# Patient Record
Sex: FEMALE | Race: WHITE | NOT HISPANIC OR LATINO | Employment: FULL TIME | URBAN - NONMETROPOLITAN AREA
[De-identification: names, ages, dates, MRNs, and addresses within clinical notes are randomized per-mention and may not be internally consistent; named-entity substitution may affect disease eponyms.]

---

## 2023-04-21 ENCOUNTER — TELEMEDICINE (OUTPATIENT)
Dept: PRIMARY CARE | Facility: CLINIC | Age: 28
End: 2023-04-21
Payer: COMMERCIAL

## 2023-04-21 DIAGNOSIS — F41.9 ANXIETY: Primary | ICD-10-CM

## 2023-04-21 DIAGNOSIS — F41.0 PANIC ATTACK: ICD-10-CM

## 2023-04-21 PROCEDURE — 99212 OFFICE O/P EST SF 10 MIN: CPT | Performed by: NURSE PRACTITIONER

## 2023-04-21 RX ORDER — HYDROXYZINE PAMOATE 25 MG/1
25 CAPSULE ORAL EVERY 6 HOURS PRN
COMMUNITY
End: 2023-06-16 | Stop reason: SDUPTHER

## 2023-04-21 RX ORDER — PAROXETINE 10 MG/1
10 TABLET, FILM COATED ORAL EVERY MORNING
Qty: 30 TABLET | Refills: 1 | Status: SHIPPED | OUTPATIENT
Start: 2023-04-21 | End: 2023-06-16 | Stop reason: SDUPTHER

## 2023-04-21 RX ORDER — LEVONORGESTREL 52 MG/1
1 INTRAUTERINE DEVICE INTRAUTERINE ONCE
COMMUNITY

## 2023-04-21 ASSESSMENT — ENCOUNTER SYMPTOMS
ORTHOPNEA: 1
WHEEZING: 0
NECK PAIN: 0
HEADACHES: 1
ABDOMINAL PAIN: 0
FEVER: 0
SYNCOPE: 0
LEG PAIN: 0
SWOLLEN GLANDS: 0
SORE THROAT: 0
HEMOPTYSIS: 0
SHORTNESS OF BREATH: 1
PND: 0
CLAUDICATION: 0
SPUTUM PRODUCTION: 1
RHINORRHEA: 0
VOMITING: 0

## 2023-04-21 NOTE — PROGRESS NOTES
"Subjective   Patient ID: Amanda Mcgill is a 28 y.o. female who presents for No chief complaint on file..    This is a virtual visit with video.  Patient is requested and is agreeable to be on video   Amanda was seen on telehealth for follow-up from an emergency room visit 5 days ago.  She states that approximately 6 days ago she was at a family picnic with multiple people she has chronic anxiety including social anxiety.  She said she had a very difficult time throughout the day interacting with people started late in the day to get some lightheadedness difficulty with breathing inability to take a deep breath.  Pounding in her chest.  Had a very difficult time sleeping that night due to the symptoms Feeling like she was going to pass out.  Her mother took her to the emergency room the following day.  Complete work-up was done and IV fluids were given secondary to \"mild dehydration\".  Emergency room provider diagnosed her with panic attacks from chronic anxiety.  They prescribed hydroxyzine 25 mg p.o. every 6 hours as needed which she has been using.  They gave her a temporary supply she needs to know what to do from here.  She states that she has regular anxiety almost daily.  Panic attacks are happening more often frequently than they used to.  Since this most recent event she is worried every single day that she is going to have another panic attack.  We discussed possible treatment she would like to take a medication daily to prevent the anxiety and panic.    Shortness of Breath  This is a new problem. The current episode started 1 to 4 weeks ago. The problem occurs daily. The problem has been waxing and waning. Associated symptoms include chest pain, ear pain, headaches, orthopnea and sputum production. Pertinent negatives include no abdominal pain, claudication, coryza, fever, hemoptysis, leg pain, leg swelling, neck pain, PND, rash, rhinorrhea, sore throat, swollen glands, syncope, vomiting or wheezing. " The symptoms are aggravated by emotional upset and any activity.        Review of Systems   Constitutional:  Negative for fever.   HENT:  Positive for ear pain. Negative for rhinorrhea and sore throat.    Respiratory:  Positive for sputum production and shortness of breath. Negative for hemoptysis and wheezing.    Cardiovascular:  Positive for chest pain and orthopnea. Negative for claudication, leg swelling, syncope and PND.   Gastrointestinal:  Negative for abdominal pain and vomiting.   Musculoskeletal:  Negative for neck pain.   Skin:  Negative for rash.   Neurological:  Positive for headaches.       Objective   There were no vitals taken for this visit.    Physical Exam  Constitutional:       Comments: Unable to complete physical exam secondary to virtual video however she was seen in her home she was walking from the outside into her house.  She is smoking a cigarette.  She looks comfortable interactive smiling.         Assessment/Plan     Amanda was seen today for follow-up.  Diagnoses and all orders for this visit:  Anxiety (Primary)  Comments:  start paxil 10 mg po qd. f/u 3 weeks. can use the hydroxyzine as needed until we find a therapeutic dose of Paxil.  Orders:  -     PARoxetine (Paxil) 10 mg tablet; Take 1 tablet (10 mg) by mouth once daily in the morning.  Panic attack  -     PARoxetine (Paxil) 10 mg tablet; Take 1 tablet (10 mg) by mouth once daily in the morning.  Other orders  -     Follow Up In Primary Care; Future

## 2023-05-12 ENCOUNTER — OFFICE VISIT (OUTPATIENT)
Dept: PRIMARY CARE | Facility: CLINIC | Age: 28
End: 2023-05-12
Payer: COMMERCIAL

## 2023-05-12 VITALS
BODY MASS INDEX: 23.39 KG/M2 | SYSTOLIC BLOOD PRESSURE: 116 MMHG | HEART RATE: 92 BPM | DIASTOLIC BLOOD PRESSURE: 80 MMHG | WEIGHT: 137 LBS | RESPIRATION RATE: 18 BRPM | HEIGHT: 64 IN | TEMPERATURE: 97.4 F | OXYGEN SATURATION: 98 %

## 2023-05-12 DIAGNOSIS — F41.0 PANIC ATTACK: ICD-10-CM

## 2023-05-12 DIAGNOSIS — F41.9 ANXIETY: ICD-10-CM

## 2023-05-12 DIAGNOSIS — E67.3 HYPERVITAMINOSIS D: Primary | ICD-10-CM

## 2023-05-12 LAB
THYROTROPIN (MIU/L) IN SER/PLAS BY DETECTION LIMIT <= 0.05 MIU/L: 0.37 MIU/L (ref 0.44–3.98)
THYROXINE (T4) FREE (NG/DL) IN SER/PLAS: 0.94 NG/DL (ref 0.61–1.12)

## 2023-05-12 PROCEDURE — 82306 VITAMIN D 25 HYDROXY: CPT

## 2023-05-12 PROCEDURE — 84443 ASSAY THYROID STIM HORMONE: CPT

## 2023-05-12 PROCEDURE — 84439 ASSAY OF FREE THYROXINE: CPT

## 2023-05-12 PROCEDURE — 82607 VITAMIN B-12: CPT

## 2023-05-12 PROCEDURE — 99213 OFFICE O/P EST LOW 20 MIN: CPT | Performed by: NURSE PRACTITIONER

## 2023-05-12 NOTE — PROGRESS NOTES
"Subjective   Patient ID: Amanda Mcgill is a 28 y.o. female who presents for Follow-up (3wk ).    Here for follow up anxiety. Started the Paxil. Still anxious when around people. Denies side effects. States she feels better but still concerned with the social anxiety. On no other meds or supplements. No other acute concerns         Review of Systems   All other systems reviewed and are negative.      Objective   /80   Pulse 92   Temp 36.3 °C (97.4 °F)   Resp 18   Ht 1.626 m (5' 4\")   Wt 62.1 kg (137 lb)   SpO2 98%   BMI 23.52 kg/m²     Physical Exam  Cardiovascular:      Rate and Rhythm: Normal rate and regular rhythm.   Pulmonary:      Effort: Pulmonary effort is normal.      Breath sounds: Normal breath sounds.   Neurological:      Mental Status: She is alert and oriented to person, place, and time.   Psychiatric:         Mood and Affect: Mood normal.         Behavior: Behavior normal.         Assessment/Plan     Amanda was seen today for follow-up.  Diagnoses and all orders for this visit:  Hypervitaminosis D (Primary)  Comments:  last Vit D leevel was 100 not on suppleemnt but was tanning. will update  Orders:  -     Vitamin D, Total  Anxiety  Comments:  better but still having breakthrough 2-3 times a week. Does not care for the hydroxyzine. Try supplements-ashwaganda and Mg. I educated  Orders:  -     Thyroid Stimulating Hormone  -     Thyroxine, Free  -     Vitamin B12  Panic attack  Comments:  cont paxil. f/u 1 month for review of S&S&  Orders:  -     Thyroid Stimulating Hormone  -     Thyroxine, Free  -     Vitamin B12  Other orders  -     Follow Up In Primary Care; Future        "

## 2023-05-12 NOTE — PATIENT INSTRUCTIONS
Anxiety: stay on the paxil 10 mg po daily. Continue the Hydroxyzine as needed for breakthrough  Raj avila to take up to 3000mg a day  Magnesium Glycinate 1 tab in am and pm x 14 days then daily

## 2023-05-13 LAB
CALCIDIOL (25 OH VITAMIN D3) (NG/ML) IN SER/PLAS: 83 NG/ML
COBALAMIN (VITAMIN B12) (PG/ML) IN SER/PLAS: 561 PG/ML (ref 211–911)

## 2023-06-16 ENCOUNTER — TELEMEDICINE (OUTPATIENT)
Dept: PRIMARY CARE | Facility: CLINIC | Age: 28
End: 2023-06-16
Payer: COMMERCIAL

## 2023-06-16 DIAGNOSIS — F41.9 ANXIETY: ICD-10-CM

## 2023-06-16 DIAGNOSIS — F41.0 PANIC ATTACK: ICD-10-CM

## 2023-06-16 PROCEDURE — 99212 OFFICE O/P EST SF 10 MIN: CPT | Performed by: NURSE PRACTITIONER

## 2023-06-16 RX ORDER — PAROXETINE HYDROCHLORIDE 20 MG/1
20 TABLET, FILM COATED ORAL EVERY MORNING
Qty: 90 TABLET | Refills: 1 | Status: SHIPPED | OUTPATIENT
Start: 2023-06-16 | End: 2023-10-26 | Stop reason: SDUPTHER

## 2023-06-16 RX ORDER — HYDROXYZINE PAMOATE 25 MG/1
25 CAPSULE ORAL EVERY 6 HOURS PRN
Qty: 30 CAPSULE | Refills: 1 | Status: SHIPPED | OUTPATIENT
Start: 2023-06-16

## 2023-06-16 NOTE — PROGRESS NOTES
This is a virtual visit with video.  Patient is requested and is agreeable to be on video follow-up to review blood work and anxiety.  She has been on Paxil 10 mg daily states she feels better but still having anxiety.  Tried magnesium-work on diet does not feel a benefit.  Continues to use hydroxyzine as needed.    We talked about risk versus benefit of ongoing anxiety.  Vitamin B12 is in a normal range could be a little bit better she can add a B12 supplement if she feels appropriate daily and sublingual.  Vitamin D level looks very good  TSH low normal with a normal free T4.  No treatment at this time I doubt this TSH is the cause of her anxiety but would be worth it to have labs repeated in about 6 months    At this time for ongoing anxiety I will increase Paxil to 20 mg p.o. daily.  She is aware this office is permanently closing I will no longer be primary care provider with [facility] she will be calling to schedule a new appointment with a new primary care provider.

## 2023-10-26 ENCOUNTER — OFFICE VISIT (OUTPATIENT)
Dept: PRIMARY CARE | Facility: CLINIC | Age: 28
End: 2023-10-26
Payer: COMMERCIAL

## 2023-10-26 VITALS
DIASTOLIC BLOOD PRESSURE: 78 MMHG | TEMPERATURE: 97.5 F | SYSTOLIC BLOOD PRESSURE: 120 MMHG | BODY MASS INDEX: 28.32 KG/M2 | WEIGHT: 165 LBS | OXYGEN SATURATION: 98 % | HEART RATE: 100 BPM

## 2023-10-26 DIAGNOSIS — F41.0 PANIC ATTACK: ICD-10-CM

## 2023-10-26 DIAGNOSIS — F41.9 ANXIETY: ICD-10-CM

## 2023-10-26 DIAGNOSIS — L40.9 PSORIASIS: Primary | ICD-10-CM

## 2023-10-26 PROCEDURE — 96372 THER/PROPH/DIAG INJ SC/IM: CPT

## 2023-10-26 PROCEDURE — 99214 OFFICE O/P EST MOD 30 MIN: CPT

## 2023-10-26 RX ORDER — PAROXETINE HYDROCHLORIDE 20 MG/1
20 TABLET, FILM COATED ORAL EVERY MORNING
Qty: 90 TABLET | Refills: 1 | Status: SHIPPED | OUTPATIENT
Start: 2023-10-26

## 2023-10-26 RX ORDER — TRIAMCINOLONE ACETONIDE 40 MG/ML
40 INJECTION, SUSPENSION INTRA-ARTICULAR; INTRAMUSCULAR ONCE
Status: COMPLETED | OUTPATIENT
Start: 2023-10-26 | End: 2023-10-26

## 2023-10-26 RX ORDER — BETAMETHASONE DIPROPIONATE 0.5 MG/G
LOTION TOPICAL 2 TIMES DAILY PRN
Qty: 60 ML | Refills: 0 | Status: SHIPPED | OUTPATIENT
Start: 2023-10-26 | End: 2024-10-25

## 2023-10-26 RX ORDER — CLOBETASOL PROPIONATE 0.05 G/100ML
1 SHAMPOO TOPICAL DAILY
Qty: 118 ML | Refills: 0 | Status: SHIPPED | OUTPATIENT
Start: 2023-10-26 | End: 2023-11-05

## 2023-10-26 RX ADMIN — TRIAMCINOLONE ACETONIDE 40 MG: 40 INJECTION, SUSPENSION INTRA-ARTICULAR; INTRAMUSCULAR at 16:28

## 2023-10-26 ASSESSMENT — ENCOUNTER SYMPTOMS
FEVER: 0
RHINORRHEA: 0
FATIGUE: 1
VOMITING: 0
NAIL CHANGES: 0
DIARRHEA: 0
SHORTNESS OF BREATH: 0
COUGH: 0
EYE PAIN: 1
SORE THROAT: 0
ANOREXIA: 0

## 2023-10-26 NOTE — PROGRESS NOTES
Subjective   Patient ID: Amanda Mcgill is a 28 y.o. female who presents for Rash (Amanda is here for a rash that started about a month ago on her scalp, Has traveled down to her neck and face. No changes in conditioner or body soap. Hot and painful to touch. ).  Subjective  Amanda Mcgill is a 28 y.o. female who was referred to me for evaluation and treatment of psoriasis. Symptoms have been ongoing for about 1 month and have been gradually worsening. The patient reports symptoms of itching, primarily affecting the arms, scalp. Lesions appear to be exacerbated by stress. Treatments tried so far include moisturizers, topical steroid (1% HC), result no change, with unsatisfactory improvement.   History of other significant skin problems: no.  Family History of skin disease: no.     Objective  Physical Exam  Lesion Locations:  arms, scalp  Average lesion size:  3cm  Color:  pink, white  Features:  scale, crusting, Auspitz's sign  Joint abnormalities absent  Other findings:  None    Assessment/Plan  Diagnoses and associated orders for this visit:    · Psoriasis   triamcinolone acetonide (Kenalog-40) injection 40 mg   clobetasoL 0.05 % shampoo; Apply 1 Dose topically once daily for 10 days.   betamethasone dipropionate (Diprosone) 0.05 % lotion; Apply topically 2 times a day as needed for irritation or rash.    · Anxiety   Comments: start paxil 10 mg po qd. f/u 3 weeks. can use the hydroxyzine as needed until we find a therapeutic dose of Paxil.   PARoxetine (Paxil) 20 mg tablet; Take 1 tablet (20 mg) by mouth once daily in the morning.    · Panic attack   PARoxetine (Paxil) 20 mg tablet; Take 1 tablet (20 mg) by mouth once daily in the morning.    · Other orders   Follow Up In Primary Care       Verbal patient instruction given.      Rash  This is a new problem. The current episode started 1 to 4 weeks ago. The problem has been rapidly worsening since onset. The affected locations include the scalp, neck, left  arm and right arm. The rash is characterized by dryness, pain, draining, itchiness and scaling. She was exposed to nothing. Associated symptoms include eye pain and fatigue. Pertinent negatives include no anorexia, congestion, cough, diarrhea, facial edema, fever, joint pain, nail changes, rhinorrhea, shortness of breath, sore throat or vomiting.       Vitals:    10/26/23 1513   BP: 120/78   Pulse: 100   Temp: 36.4 °C (97.5 °F)   SpO2: 98%       Review of Systems   Constitutional:  Positive for fatigue. Negative for fever.   HENT:  Negative for congestion, rhinorrhea and sore throat.    Eyes:  Positive for pain.   Respiratory:  Negative for cough and shortness of breath.    Gastrointestinal:  Negative for anorexia, diarrhea and vomiting.   Musculoskeletal:  Negative for joint pain.   Skin:  Positive for rash. Negative for nail changes.       Objective   Physical Exam  Vitals and nursing note reviewed.   Skin:            Comments: Dry skin dermatitis with lesions on bilateral arms    Plaques noted on posterior scalp with white patches in the hair as well.   Itching for patient.          Assessment/Plan   Problem List Items Addressed This Visit    None  Visit Diagnoses       Psoriasis    -  Primary    Relevant Medications    triamcinolone acetonide (Kenalog-40) injection 40 mg (Completed)    clobetasoL 0.05 % shampoo    betamethasone dipropionate (Diprosone) 0.05 % lotion    Anxiety        start paxil 10 mg po qd. f/u 3 weeks. can use the hydroxyzine as needed until we find a therapeutic dose of Paxil.    Relevant Medications    PARoxetine (Paxil) 20 mg tablet    Panic attack        Relevant Medications    PARoxetine (Paxil) 20 mg tablet                 Thank you for coming in today, please call my office if you have any concerns or questions.     German ARIZA, CNP

## 2023-10-26 NOTE — PATIENT INSTRUCTIONS
Steroid cream for the scalp, point treatment betamethasone for the skin lesions    Kenalog injection IM  Notify the office if no improvement in 3 days on Monday.     Thank you for coming in today, if any questions or concerns arise, please call my office.   German Lyons, APRN-CNP]

## 2023-10-27 ENCOUNTER — APPOINTMENT (OUTPATIENT)
Dept: PRIMARY CARE | Facility: CLINIC | Age: 28
End: 2023-10-27
Payer: COMMERCIAL